# Patient Record
(demographics unavailable — no encounter records)

---

## 2024-11-14 NOTE — DEVELOPMENTAL MILESTONES
[Normal Development] : Normal Development [None] : none [Urinates in a potty or toilet] : urinates in a potty or toilet [Plays pretend with toys or dolls] : plays pretend with toys or dolls [Pokes food with fork] : pokes food with fork [Uses pronouns correctly] : uses pronouns correctly [Names at least one color] : names at least one color [Walks up steps, using one] : walks up steps, using one foot, then the other [Runs well without falling] : runs well without falling [Grasps crayon with thumb] : grasps crayon with thumb and fingers instead of fist [Catches a large ball] : catches a large ball [Explains the reason for things,] : explains the reason for things, such as needing a sweater when it's cold

## 2024-11-14 NOTE — DISCUSSION/SUMMARY
[Normal Growth] : growth [Normal Development] : development [None] : No known medical problems [No Elimination Concerns] : elimination [No Feeding Concerns] : feeding [No Skin Concerns] : skin [Normal Sleep Pattern] : sleep [No Medications] : ~He/She~ is not on any medications [Parent/Guardian] : parent/guardian [FreeTextEntry1] : Continue cow's milk. Continue table foods, 3 meals with 2-3 snacks per day. Incorporate water daily in a sippy cup.  Brush teeth twice a day with soft toothbrush. Recommend visit to dentist. Fluoride vitamin daily. When in car, keep child in rear-facing car seats until age 2, or until  the maximum height and weight for seat is reached.  Put toddler to sleep in own bed. Help toddler to maintain consistent daily routines and sleep schedule.  Toilet training discussed. Ensure home is safe.  Use consistent, positive discipline. Read aloud to toddler. Limit screen time to no more than 2 hours per day. CBC, Lead, CRP, Ferritin ordered. Task sent to Claritza GIBSON concerning visualizations, rec mother f/u with psych for now  Hep A, flu and COVID vaccines discussed and deferred. risks discussed at length  Ref to audiology regarding noise sensitivity  Topical emollients daily(i.e. Aquaphor, Eucerin, Cerave, etc.). Importance of sunscreen d/w parent. Dermatology follow up prn.  Follow up as needed for persistent or worsening symptoms. Return in 6 months for PE.

## 2024-11-14 NOTE — HISTORY OF PRESENT ILLNESS
[Mother] : mother [Normal] : Normal [Sippy cup use] : Sippy cup use [Bottle Use] : Bottle use [Brushing teeth] : Brushing teeth [Vitamin] : Primary Fluoride Source: Vitamin [Playtime (60 min/d)] : Playtime 60 min a day [No] : Not at  exposure [Water heater temperature set at <120 degrees F] : Water heater temperature set at <120 degrees F [Car seat in back seat] : Car seat in back seat [Carbon Monoxide Detectors] : Carbon monoxide detectors [Smoke Detectors] : Smoke detectors [Supervised play near cars and streets] : Supervised play near cars and streets [Influenza] : Influenza [Hepatitis A] : Hepatitis A [COVID-19] : COVID-19 [NO] : No [Exposure to electronic nicotine delivery system] : No exposure to electronic nicotine delivery system [de-identified] : well-rounded with fruits, veg, meat, dairy  [de-identified] : still uses bottle at night  [FreeTextEntry1] : mother reports patient is very sensitive to loud noises- had to pull her out of pre-k because she complained it was too loud mother interested in doing IQ testing for child  mother reports patient has been vocalizing that she sees relatives who have

## 2024-11-14 NOTE — CARE PLAN
[Care Plan reviewed and provided to patient/caregiver] : Care plan reviewed and provided to patient/caregiver [Care Plan reviewed every ___ weeks] : Care plan reviewed every [unfilled] weeks [Understands and communicates without difficulty] : Patient/Caregiver understands and communicates without difficulty [FreeTextEntry2] :     Parent will stay diligent with application of Eczema creams (Eucerin, Cerave, etc.) Parent will apply Aquaphor to dry skin patches at night Parent will apply sunscreen daily Parent will read labels of products before applying anything to skin to avoid triggering chemicals, scents, dyes    [FreeTextEntry3] : Patient's eczema will improve and not spread  Patient will avoid sunburn Parent will practice good skin care  Patient will follow up with dermatologist as necessary

## 2024-11-14 NOTE — PHYSICAL EXAM

## 2025-02-25 NOTE — DISCUSSION/SUMMARY
[FreeTextEntry1] : Anticipatory guidance and parent education given.  UA WNL likely constipation, will f/u with urine culture results  Recommend increased dietary fiber and probiotic daily  Glycerin suppository if necessary  Advised using Miralax daily, titrating to effect.  Small amount of juice such as prune and pear okay.  Decrease amount of dairy in diet until symptoms resolve.  Return if symptoms worsen or persist.

## 2025-02-25 NOTE — HISTORY OF PRESENT ILLNESS
[de-identified] : increased urinary frequency/urgency [FreeTextEntry6] : BIB mother for increased urinary frequency/urgency since yesterday. mother reports patient has been running to the toilet and saying she has to urinate but then nothing comes out. patient says its painful when she tries. mother reports patient also stool withholds and suffers from constipation. passed two "small hard pellets" yesterday, nothing since.  No fever. No difficulty breathing, cough, congestion. No v/d. No rash. No fatigue. Good po/uop/bm. Normal sleep and activity.

## 2025-02-25 NOTE — PHYSICAL EXAM
[Soft] : soft [Tender] : nontender [Distended] : nondistended [Hepatosplenomegaly] : no hepatosplenomegaly [Splenomegaly] : no splenomegaly [Hepatomegaly] : no hepatomegaly [Normal External Genitalia] : normal external genitalia [Labial Adhesions] : no labial adhesions [Erythematous Labia Minora] : nonerythematous labia minora [Erythematous Labia Majora] : nonerythematous labia majora [Excoriations in Perineum] : no excoriations in perineum [Vaginal Discharge] : no vaginal discharge [NL] : warm, clear [FreeTextEntry9] : some pain to palpation of area around umbilicus

## 2025-04-30 NOTE — DISCUSSION/SUMMARY
[FreeTextEntry1] : Anticipatory guidance and parent education given.  ? allergic in nature Rec trial children's zyrtec Avoid exposure to environmental allergens.  Wash hands and change clothing after being outdoors.  Shower nightly prior to going to bed. Use nasal saline 2-3 times daily.  Follow up as needed for persistent or worsening symptoms, questions and concerns.

## 2025-04-30 NOTE — HISTORY OF PRESENT ILLNESS
[de-identified] : rash [FreeTextEntry6] : BIB parents for rash. Parents report that past two days patient has developed a red blotchy flat rash behind her legs after playing outside that is gone by the morning.  Parents report slight itchiness. Deny new products or lotions.  No fever. No difficulty breathing, cough, congestion. No v/d. No fatigue. Good po/uop/bm. Normal sleep and activity. Currently without rash. Picture provided on phone showing a flat appearing erythematous blotchy rash behind bilateral legs from midthigh to ankles.  Father reports neighbors cut down trees in backyard, patient with + hx of tree allergy

## 2025-05-09 NOTE — HISTORY OF PRESENT ILLNESS
[Parents] : parents [Fruit] : fruit [Vegetables] : vegetables [Meat] : meat [Grains] : grains [Eggs] : eggs [Dairy] : dairy [Normal] : Normal [In bed] : In bed [Sippy cup use] : Sippy cup use [Brushing teeth] : Brushing teeth [Playtime (60 min/d)] : Playtime 60 min a day [< 2 hrs of screen time] : Less than 2 hrs of screen time [Appropiate parent-child communication] : Appropriate parent-child communication [Child given choices] : Child given choices [Child Cooperates] : Child cooperates [Parent has appropriate responses to behavior] : Parent has appropriate responses to behavior [No] : Not at  exposure [Water heater temperature set at <120 degrees F] : Water heater temperature set at <120 degrees F [Car seat in back seat] : Car seat in back seat [Smoke Detectors] : Smoke detectors [Supervised play near cars and streets] : Supervised play near cars and streets [Carbon Monoxide Detectors] : Carbon monoxide detectors [Hepatitis A] : Hepatitis A [NO] : No

## 2025-05-09 NOTE — PHYSICAL EXAM

## 2025-05-09 NOTE — DISCUSSION/SUMMARY
[Normal Growth] : growth [Normal Development] : development [None] : No known medical problems [No Elimination Concerns] : elimination [No Feeding Concerns] : feeding [No Skin Concerns] : skin [Normal Sleep Pattern] : sleep [No Medications] : ~He/She~ is not on any medications [Parent/Guardian] : parent/guardian [FreeTextEntry1] : Continue balanced diet with all food groups.  Brush teeth twice a day with toothbrush. Recommend visit to dentist. Fluoride vitamin daily. As per car seat 's guidelines, use forward-facing car seat in back seat of car. Switch to booster seat when child reaches highest weight/height for seat. Child needs to ride in a belt-positioning booster seat until  4 feet 9 inches has been reached and are between 8 and 12 years of age.  Put toddler to sleep in own bed. Help toddler to maintain consistent daily routines and sleep schedule.  Pre-K discussed.  Ensure home is safe.  Use consistent, positive discipline. Read aloud to toddler. Limit screen time to no more than 2 hours per day. Patient uncooperative for vitals/vision, will reassess next in office visit  Return for well child check in 1 year.